# Patient Record
Sex: FEMALE | Race: WHITE | NOT HISPANIC OR LATINO | URBAN - METROPOLITAN AREA
[De-identification: names, ages, dates, MRNs, and addresses within clinical notes are randomized per-mention and may not be internally consistent; named-entity substitution may affect disease eponyms.]

---

## 2019-12-18 ENCOUNTER — EMERGENCY (EMERGENCY)
Facility: HOSPITAL | Age: 24
LOS: 1 days | End: 2019-12-18
Admitting: EMERGENCY MEDICINE
Payer: MEDICAID

## 2019-12-18 PROCEDURE — 99283 EMERGENCY DEPT VISIT LOW MDM: CPT

## 2019-12-31 ENCOUNTER — EMERGENCY (EMERGENCY)
Facility: HOSPITAL | Age: 24
LOS: 1 days | End: 2019-12-31
Attending: EMERGENCY MEDICINE
Payer: COMMERCIAL

## 2019-12-31 VITALS
SYSTOLIC BLOOD PRESSURE: 133 MMHG | HEART RATE: 98 BPM | DIASTOLIC BLOOD PRESSURE: 81 MMHG | HEIGHT: 69 IN | OXYGEN SATURATION: 98 % | WEIGHT: 240.08 LBS | RESPIRATION RATE: 18 BRPM | TEMPERATURE: 98 F

## 2019-12-31 PROCEDURE — 96372 THER/PROPH/DIAG INJ SC/IM: CPT

## 2019-12-31 PROCEDURE — 99283 EMERGENCY DEPT VISIT LOW MDM: CPT

## 2019-12-31 PROCEDURE — 99283 EMERGENCY DEPT VISIT LOW MDM: CPT | Mod: 25

## 2019-12-31 RX ORDER — RANITIDINE HYDROCHLORIDE 150 MG/1
1 TABLET, FILM COATED ORAL
Qty: 14 | Refills: 0
Start: 2019-12-31 | End: 2020-01-13

## 2019-12-31 RX ORDER — DEXAMETHASONE 0.5 MG/5ML
6 ELIXIR ORAL ONCE
Refills: 0 | Status: COMPLETED | OUTPATIENT
Start: 2019-12-31 | End: 2019-12-31

## 2019-12-31 RX ADMIN — Medication 6 MILLIGRAM(S): at 10:17

## 2019-12-31 NOTE — ED STATDOCS - ENMT, MLM
lymphoid hyperplasia mild erythema, no thyroid enlargement or tenderness, no cervical lymphadenopathy

## 2019-12-31 NOTE — ED ADULT TRIAGE NOTE - CHIEF COMPLAINT QUOTE
woken up out of sleep with short of breath. throat pain, has seen PMD. was told that she has bronchitis and pharyngitis.

## 2019-12-31 NOTE — ED STATDOCS - CLINICAL SUMMARY MEDICAL DECISION MAKING FREE TEXT BOX
chronic cough, hoarse voice, possible acid reflux, empiric  treatment with Pepcid and follow up with ENT

## 2019-12-31 NOTE — ED STATDOCS - CARE PROVIDER_API CALL
Sarmad Saldana)  Otolaryngology  [facility] [facility] [facility], [address]  [place] [address]  Phone: [phone]  Fax: [phone]  Follow Up Time: 4-6 Days    Haley Bañuelos (DO)  Gastroenterology  [address] [facility] [address], [address]  [place] [address]  Phone: [phone]  Fax: [phone]  Follow Up Time: 7-10 Days

## 2019-12-31 NOTE — ED STATDOCS - NSFOLLOWUPINSTRUCTIONS_ED_ALL_ED_FT
take ranitidine at night as prescribed.  Follow-up with ENT and GI ASAP: call today to arrange an appointment.

## 2019-12-31 NOTE — ED STATDOCS - PATIENT PORTAL LINK FT
You can access the FollowMyHealth Patient Portal offered by [facility] by registering at the following website: [email or web address]. By joining [facility]’s FollowMyHealth portal, you will also be able to view your health information using other applications (apps) compatible with our system.

## 2019-12-31 NOTE — ED STATDOCS - OBJECTIVE STATEMENT
23 y/o F pt with hx of tonsilitis & hoarseness voice (high school) presents to ED c/o constant nasal congestion and cough for the past 2 months. Patient states she's been seen by [facility] x2, [facility] dx with URI given abx, sprays, cough syrup, decongestions with no relief. Pt states because of nasal congestion she's been having difficulty breathing when she sleeps. She was advised to follow up with ENT, however unable due to holiday season. No GI hx. No further complaints at this time. 23 y/o F pt with hx of tonsilitis & hoarseness voice (since high school) presents to ED c/o constant nasal congestion and cough for the past 2 months. Patient states she's been seen by [facility] x2, and at the ED at [facility] once; dx with URI given abx, sprays, cough syrup, decongestions with no relief. Pt states because of nasal congestion she's been having difficulty breathing when she sleeps. She was advised to follow up with ENT, but reports that she has been unable to schedule because of the holiday season. No GI hx; denies heartburn, dyspepsia.  Denies sour taste in mouth. Denies lip swelling or tongue swelling.  No further complaints at this time.

## 2020-01-07 ENCOUNTER — EMERGENCY (EMERGENCY)
Facility: HOSPITAL | Age: 25
LOS: 1 days | End: 2020-01-07
Admitting: EMERGENCY MEDICINE
Payer: MEDICAID

## 2020-01-07 PROCEDURE — 71045 X-RAY EXAM CHEST 1 VIEW: CPT | Mod: 26

## 2020-01-07 PROCEDURE — 99284 EMERGENCY DEPT VISIT MOD MDM: CPT

## 2020-01-23 PROBLEM — Z78.9 OTHER SPECIFIED HEALTH STATUS: Chronic | Status: ACTIVE | Noted: 2020-01-08

## 2020-01-23 PROBLEM — Z00.00 ENCOUNTER FOR PREVENTIVE HEALTH EXAMINATION: Status: ACTIVE | Noted: 2020-01-23

## 2020-01-29 ENCOUNTER — APPOINTMENT (OUTPATIENT)
Dept: CARDIOLOGY | Facility: CLINIC | Age: 25
End: 2020-01-29

## 2020-02-04 ENCOUNTER — APPOINTMENT (OUTPATIENT)
Dept: CARDIOLOGY | Facility: CLINIC | Age: 25
End: 2020-02-04
Payer: MEDICAID

## 2020-02-04 VITALS
OXYGEN SATURATION: 99 % | WEIGHT: 230 LBS | BODY MASS INDEX: 34.07 KG/M2 | HEART RATE: 89 BPM | SYSTOLIC BLOOD PRESSURE: 118 MMHG | HEIGHT: 69 IN | DIASTOLIC BLOOD PRESSURE: 70 MMHG

## 2020-02-04 DIAGNOSIS — Z82.3 FAMILY HISTORY OF STROKE: ICD-10-CM

## 2020-02-04 DIAGNOSIS — Z83.49 FAMILY HISTORY OF OTHER ENDOCRINE, NUTRITIONAL AND METABOLIC DISEASES: ICD-10-CM

## 2020-02-04 DIAGNOSIS — Z78.9 OTHER SPECIFIED HEALTH STATUS: ICD-10-CM

## 2020-02-04 DIAGNOSIS — Z83.438 FAMILY HISTORY OF OTHER DISORDER OF LIPOPROTEIN METABOLISM AND OTHER LIPIDEMIA: ICD-10-CM

## 2020-02-04 DIAGNOSIS — R42 DIZZINESS AND GIDDINESS: ICD-10-CM

## 2020-02-04 DIAGNOSIS — Z82.49 FAMILY HISTORY OF ISCHEMIC HEART DISEASE AND OTHER DISEASES OF THE CIRCULATORY SYSTEM: ICD-10-CM

## 2020-02-04 PROCEDURE — 99203 OFFICE O/P NEW LOW 30 MIN: CPT

## 2020-02-04 NOTE — HISTORY OF PRESENT ILLNESS
[FreeTextEntry1] : 24 year old female with no significant PMHx who presents with episode of dizziness. Occurred 1/7/2020, while walking to her car. All of a sudden she felt her surroundings spinning. Her boyfriend helped her to the ground. She did not lose consciousness. She was taken to [facility] ED. ECG was normal. Labs were normal. Chest X-Ray were normal. Patient was suffering from URI at the time. Patient had a similar episode about 4 years ago while swimming. She saw a neurologist at that time who thought it was vertigo and they recommended meclizine, however patient states she went 4 years without recurrence so she never had to take it. She has no exertional chest pain, SOB, or palpitations.

## 2020-02-04 NOTE — REVIEW OF SYSTEMS
[see HPI] : see HPI [Dizziness] : dizziness [Negative] : Heme/Lymph [Shortness Of Breath] : no shortness of breath [Chest Pain] : no chest pain [Dyspnea on exertion] : not dyspnea during exertion [Palpitations] : no palpitations [Lower Ext Edema] : no extremity edema

## 2020-02-04 NOTE — DISCUSSION/SUMMARY
[FreeTextEntry1] : 1. Dizziness: likely vertigo. Not syncope. Recommend neurology or ENT evaluation. Both times it occurred patient had trigger (water in the ear and this time possible viral etiology). No need for cardiac testing at this point in time as patient's ECG and cardiovascular physical examination were normal. \par \par Follow up on an as needed basis.

## 2020-02-04 NOTE — PHYSICAL EXAM
[General Appearance - Well Developed] : well developed [Well Groomed] : well groomed [General Appearance - Well Nourished] : well nourished [Normal Appearance] : normal appearance [Normal Conjunctiva] : the conjunctiva exhibited no abnormalities [General Appearance - In No Acute Distress] : no acute distress [Eyelids - No Xanthelasma] : the eyelids demonstrated no xanthelasmas [No Oral Pallor] : no oral pallor [Normal Oral Mucosa] : normal oral mucosa [Heart Rate And Rhythm] : heart rate and rhythm were normal [No Oral Cyanosis] : no oral cyanosis [Heart Sounds] : normal S1 and S2 [Murmurs] : no murmurs present [Edema] : no peripheral edema present [Exaggerated Use Of Accessory Muscles For Inspiration] : no accessory muscle use [Respiration, Rhythm And Depth] : normal respiratory rhythm and effort [Abnormal Walk] : normal gait [Auscultation Breath Sounds / Voice Sounds] : lungs were clear to auscultation bilaterally [Cyanosis, Localized] : no localized cyanosis [Gait - Sufficient For Exercise Testing] : the gait was sufficient for exercise testing [Nail Clubbing] : no clubbing of the fingernails [Skin Color & Pigmentation] : normal skin color and pigmentation [Skin Turgor] : normal skin turgor [] : no rash [Impaired Insight] : insight and judgment were intact [Memory Recent] : recent memory was not impaired [Affect] : the affect was normal

## 2020-05-05 ENCOUNTER — APPOINTMENT (OUTPATIENT)
Dept: MRI IMAGING | Facility: CLINIC | Age: 25
End: 2020-05-05
Payer: MEDICAID

## 2020-05-05 PROCEDURE — 72195 MRI PELVIS W/O DYE: CPT

## 2022-04-05 ENCOUNTER — APPOINTMENT (OUTPATIENT)
Dept: CARDIOLOGY | Facility: CLINIC | Age: 27
End: 2022-04-05

## 2023-05-01 ENCOUNTER — NEW PATIENT (OUTPATIENT)
Dept: URBAN - METROPOLITAN AREA CLINIC 39 | Facility: CLINIC | Age: 28
End: 2023-05-01

## 2023-05-01 DIAGNOSIS — Z97.3: ICD-10-CM

## 2023-05-01 DIAGNOSIS — H52.203: ICD-10-CM

## 2023-05-01 DIAGNOSIS — H52.13: ICD-10-CM

## 2023-05-01 PROCEDURE — 92015 DETERMINE REFRACTIVE STATE: CPT

## 2023-05-01 PROCEDURE — 92310-1 LEVEL 1 CONTACT LENS MANAGEMENT

## 2023-05-01 PROCEDURE — 92004 COMPRE OPH EXAM NEW PT 1/>: CPT

## 2023-05-01 ASSESSMENT — TONOMETRY
OS_IOP_MMHG: 18
OD_IOP_MMHG: 16

## 2023-05-01 ASSESSMENT — VISUAL ACUITY
OD_SC: 20/200
OU_SC: 20/200
OD_CC: 20/40
OU_CC: J1+
OS_SC: 20/400
OD_CC: J2
OS_SC: J5
OS_CC: J1
OD_SC: J7
OU_SC: J3
OS_CC: 20/25
OU_CC: 20/20